# Patient Record
Sex: MALE | URBAN - METROPOLITAN AREA
[De-identification: names, ages, dates, MRNs, and addresses within clinical notes are randomized per-mention and may not be internally consistent; named-entity substitution may affect disease eponyms.]

---

## 2020-09-24 ENCOUNTER — TELEPHONE (OUTPATIENT)
Dept: ADMINISTRATIVE | Age: 50
End: 2020-09-24

## 2020-09-24 NOTE — TELEPHONE ENCOUNTER
Pt called to schedule a work physical with PCP Dr Danielle Bill. Explained to pt work physicals are scheduled through occupational health. Pt did not want the number and then changed his wording, calling it his regular yearly physical with Dr. Danielle Bill. Searched for pt multiple ways. Name,SS#, multiple phone numbers, date of birth. Could find no history of pt. Asked pt when he last saw Dr. Danielle Bill, pt could not remember and then said about 2/3 years, could not really remember. Please advise if pt is known to the practice/ Dr. Danielle Bill. (Office protocols states Dr Danielle Bill is not  accepting any new patients). Wanted to verify with office if pt is known. Thank you for your help.

## 2020-09-25 NOTE — TELEPHONE ENCOUNTER
Okay to close? No contact information. Tried searching patient with different spelling of last name, no results.